# Patient Record
Sex: MALE | Race: WHITE | Employment: FULL TIME | URBAN - NONMETROPOLITAN AREA
[De-identification: names, ages, dates, MRNs, and addresses within clinical notes are randomized per-mention and may not be internally consistent; named-entity substitution may affect disease eponyms.]

---

## 2019-07-05 ENCOUNTER — HOSPITAL ENCOUNTER (EMERGENCY)
Age: 54
Discharge: HOME | End: 2019-07-05
Payer: COMMERCIAL

## 2019-07-05 ENCOUNTER — APPOINTMENT (OUTPATIENT)
Dept: GENERAL RADIOLOGY | Age: 54
End: 2019-07-05
Payer: COMMERCIAL

## 2019-07-05 VITALS
DIASTOLIC BLOOD PRESSURE: 90 MMHG | OXYGEN SATURATION: 99 % | HEART RATE: 80 BPM | SYSTOLIC BLOOD PRESSURE: 135 MMHG | WEIGHT: 170 LBS | TEMPERATURE: 98 F | RESPIRATION RATE: 14 BRPM

## 2019-07-05 DIAGNOSIS — M79.674 PAIN OF RIGHT GREAT TOE: Primary | ICD-10-CM

## 2019-07-05 PROCEDURE — 99283 EMERGENCY DEPT VISIT LOW MDM: CPT

## 2019-07-05 PROCEDURE — 2709999900 HC NON-CHARGEABLE SUPPLY

## 2019-07-05 PROCEDURE — 73660 X-RAY EXAM OF TOE(S): CPT

## 2019-07-05 ASSESSMENT — ENCOUNTER SYMPTOMS
WHEEZING: 0
COUGH: 0
EYE DISCHARGE: 0
SHORTNESS OF BREATH: 0
RHINORRHEA: 0
DIARRHEA: 0
BACK PAIN: 0
EYE REDNESS: 0
ABDOMINAL PAIN: 0
VOMITING: 0
SORE THROAT: 0
NAUSEA: 0

## 2019-07-05 ASSESSMENT — PAIN SCALES - GENERAL: PAINLEVEL_OUTOF10: 6

## 2019-07-05 ASSESSMENT — PAIN DESCRIPTION - PAIN TYPE: TYPE: ACUTE PAIN

## 2019-07-05 ASSESSMENT — PAIN DESCRIPTION - LOCATION: LOCATION: TOE (COMMENT WHICH ONE)

## 2019-07-05 ASSESSMENT — PAIN DESCRIPTION - ORIENTATION: ORIENTATION: RIGHT

## 2019-07-05 NOTE — ED TRIAGE NOTES
Patient comes to the ED room B chief complaint of \"stepped on a stone\" x3 weeks ago. States Great toe has hurt since that time. Slight bruising noted to the top of the Rt great toe.   No other injury

## 2019-07-05 NOTE — ED PROVIDER NOTES
symptoms become more severe in nature or otherwise change. CRITICAL CARE:   None      CONSULTS:  None     PROCEDURES:  None    FINAL IMPRESSION     1. Pain of right great toe          DISPOSITION/PLAN   discharge    PATIENT REFERRED TO:  DO Edy Carey [facility] [place] 1933  [phone]    Schedule an appointment as soon as possible for a visit   As needed      DISCHARGE MEDICATIONS:  There are no discharge medications for this patient. (Please note that portions of this note were completed with a voice recognition program.Efforts weremade to edit the dictations but occasionally words are mis-transcribed.)    The patient was given an opportunity to see the EmergencyAttending. Thepatient voiced understanding that I was a Mid-Level Provider and was in agreement with being seen independently by myself. Scribe:  Marcela Ritchie 7/5/19 12:22 PM Scribing for and in thepresence of CHAD Prieto. Signed by: Michael Sandhu, 07/05/19 4:22 PM    Provider:  I personally performed the services described in the documentation, reviewed and edited the documentation which was dictated to the scribe in my presence, and it accurately records my wordsand actions.     Yasmin Najera CNP 7/5/19 4:22 PM      JASON Blount - CHAD  07/05/19 [id number]